# Patient Record
Sex: MALE | Race: BLACK OR AFRICAN AMERICAN | NOT HISPANIC OR LATINO | Employment: UNEMPLOYED | URBAN - METROPOLITAN AREA
[De-identification: names, ages, dates, MRNs, and addresses within clinical notes are randomized per-mention and may not be internally consistent; named-entity substitution may affect disease eponyms.]

---

## 2017-11-28 PROBLEM — I10 HTN (HYPERTENSION): Status: ACTIVE | Noted: 2017-11-28

## 2017-11-28 PROBLEM — R07.89 ATYPICAL CHEST PAIN: Status: ACTIVE | Noted: 2017-11-28

## 2017-11-28 PROBLEM — R07.9 CHEST PAIN WITH MODERATE RISK FOR CARDIAC ETIOLOGY: Status: ACTIVE | Noted: 2017-11-28

## 2017-11-28 PROBLEM — M54.2 NECK PAIN: Status: ACTIVE | Noted: 2017-11-28

## 2017-11-28 PROBLEM — R51.9 HEADACHE: Status: ACTIVE | Noted: 2017-11-28

## 2018-04-30 ENCOUNTER — TELEPHONE (OUTPATIENT)
Dept: NEUROLOGY | Facility: CLINIC | Age: 38
End: 2018-04-30

## 2018-04-30 NOTE — TELEPHONE ENCOUNTER
----- Message from Sara Douglas sent at 4/30/2018  1:16 PM CDT -----  Call [phone] ... Pt states he was released Rony morning from [facility] advised to see Dr Haily Douglas / but doesn't know anything about a referral

## 2018-05-22 ENCOUNTER — TELEPHONE (OUTPATIENT)
Dept: NEUROLOGY | Facility: CLINIC | Age: 38
End: 2018-05-22

## 2018-05-22 NOTE — TELEPHONE ENCOUNTER
----- Message from Ulices Oh sent at 5/21/2018  5:13 PM CDT -----  Contact: Pt  Pt called to schedule appt due to verbal referral from pcp.    Pt is experiencing bad headaches and facial muscle spasms.    Pt can be reached at [phone].    Thank you!

## 2018-05-22 NOTE — TELEPHONE ENCOUNTER
----- Message from Ulices Oh sent at 5/21/2018  5:13 PM CDT -----  Contact: Pt  Pt called to schedule appt due to verbal referral from pcp.    Pt is experiencing bad headaches and facial muscle spasms.    Pt can be reached at [phone].    Thank you!

## 2018-05-22 NOTE — TELEPHONE ENCOUNTER
Called pt in regards to scheduling new patient appointment. Informed pt at this time we have no medicaid spots available. Pt was given neurology on the [facility] number. Pt verbalized understanding.

## 2019-12-07 PROBLEM — E87.6 HYPOKALEMIA: Status: ACTIVE | Noted: 2019-12-07

## 2019-12-07 PROBLEM — G89.29 CHRONIC MIDLINE LOW BACK PAIN WITHOUT SCIATICA: Status: ACTIVE | Noted: 2019-12-07

## 2019-12-07 PROBLEM — G35 MULTIPLE SCLEROSIS: Status: ACTIVE | Noted: 2019-12-07

## 2019-12-07 PROBLEM — E78.5 HYPERLIPIDEMIA: Status: ACTIVE | Noted: 2019-12-07

## 2019-12-07 PROBLEM — G45.9 TIA (TRANSIENT ISCHEMIC ATTACK): Status: ACTIVE | Noted: 2019-12-07

## 2019-12-07 PROBLEM — M54.50 CHRONIC MIDLINE LOW BACK PAIN WITHOUT SCIATICA: Status: ACTIVE | Noted: 2019-12-07

## 2019-12-22 PROBLEM — R29.818 FOCAL NEUROLOGICAL DEFICIT: Status: ACTIVE | Noted: 2019-12-22

## 2020-07-16 PROBLEM — R06.02 SHORTNESS OF BREATH: Status: ACTIVE | Noted: 2020-07-16

## 2020-07-17 DIAGNOSIS — U07.1 COVID-19 VIRUS DETECTED: ICD-10-CM

## 2020-07-18 PROBLEM — J12.82 PNEUMONIA DUE TO COVID-19 VIRUS: Status: ACTIVE | Noted: 2020-07-18

## 2020-07-18 PROBLEM — U07.1 PNEUMONIA DUE TO COVID-19 VIRUS: Status: ACTIVE | Noted: 2020-07-18

## 2020-07-22 PROBLEM — R07.89 ATYPICAL CHEST PAIN: Status: RESOLVED | Noted: 2017-11-28 | Resolved: 2020-07-22

## 2020-07-29 ENCOUNTER — HOSPITAL ENCOUNTER (EMERGENCY)
Facility: HOSPITAL | Age: 40
Discharge: HOME | End: 2020-07-29
Attending: EMERGENCY MEDICINE
Payer: MEDICAID

## 2020-07-29 VITALS
HEART RATE: 74 BPM | WEIGHT: 220 LBS | BODY MASS INDEX: 34.53 KG/M2 | TEMPERATURE: 99 F | OXYGEN SATURATION: 98 % | SYSTOLIC BLOOD PRESSURE: 135 MMHG | RESPIRATION RATE: 18 BRPM | HEIGHT: 67 IN | DIASTOLIC BLOOD PRESSURE: 90 MMHG

## 2020-07-29 DIAGNOSIS — R50.9 COUGH WITH FEVER: ICD-10-CM

## 2020-07-29 DIAGNOSIS — R05.9 COUGH WITH FEVER: ICD-10-CM

## 2020-07-29 DIAGNOSIS — R05.9 COUGH: Primary | ICD-10-CM

## 2020-07-29 DIAGNOSIS — Z71.89 EDUCATED ABOUT COVID-19 VIRUS INFECTION: ICD-10-CM

## 2020-07-29 PROCEDURE — 99283 EMERGENCY DEPT VISIT LOW MDM: CPT | Mod: 25

## 2020-07-29 NOTE — DISCHARGE INSTRUCTIONS
Home quarantineAs discussed  Follow-up with [facility]  or your primary care provider  Return for any concerns

## 2020-07-29 NOTE — ED PROVIDER NOTES
Encounter Date: 7/29/2020       History     Chief Complaint   Patient presents with    Cough     Pt states tested positive for covid on 7/16/20. Pt c/o feeling like he has a lot of phelgm that he is not able to cough up.     40-year-old well-appearing male presents to the emergency department he tested positive for the COVID virus on July 16 he was admitted to the hospital on July 18th he was discharged that he went back to the emergency department on July 20th secondary to being anxious related to his recent COVID diagnosis this patient is been on multiple regimens of antibiotics and even has taken a round of from does severe.  The patient is supposed to be on home quarantine and have pulse ox monitoring however the patient decided he was going to go somewhere today while he was driving he still it states that he felt as if he had some phlegm in his throat and coughed so he came to this emergency department he appears well he has no obvious respiratory distress he is afebrile he is oxygenating well on room air.        Review of patient's allergies indicates:  No Known Allergies  Past Medical History:   Diagnosis Date    Atypical chest pain 11/28/2017    Cervical stenosis of spine     Herniated disc, cervical     Hypertension     Lumbar back pain 08/2016    Multiple sclerosis 2018    Neuropathy     Testicular torsion      Past Surgical History:   Procedure Laterality Date    TESTICLE SURGERY       No family history on file.  Social History     Tobacco Use    Smoking status: Never Smoker    Smokeless tobacco: Never Used   Substance Use Topics    Alcohol use: No    Drug use: No     Review of Systems   Constitutional: Negative for chills and fever.   HENT: Negative.    Respiratory: Positive for cough. Negative for shortness of breath.    Cardiovascular: Negative.    Gastrointestinal: Negative.    Musculoskeletal: Negative.    Skin: Negative.    Hematological: Negative.    Psychiatric/Behavioral: Negative.         Physical Exam     Initial Vitals [07/29/20 1426]   BP Pulse Resp Temp SpO2   121/89 67 19 98.7 °F (37.1 °C) 97 %      MAP       --         Physical Exam    Nursing note and vitals reviewed.  Constitutional: He appears well-developed and well-nourished.   HENT:   Head: Normocephalic.   Eyes: Pupils are equal, round, and reactive to light.   Cardiovascular: Normal rate, regular rhythm, normal heart sounds and intact distal pulses.   Pulmonary/Chest: Breath sounds normal. No respiratory distress.   Abdominal: Soft.   Neurological: He is alert and oriented to person, place, and time. He has normal strength.   Skin: Skin is warm.         ED Course   Procedures  Labs Reviewed - No data to display       Imaging Results          X-Ray Chest PA And Lateral (Final result)  Result time 07/29/20 15:43:18    Final result by Ramesh Wang MD (07/29/20 15:43:18)                 Narrative:    Reason: Cough and fever.    FINDINGS:    PA and lateral chest with comparison chest x-ray July 18, 2020 show  normal cardiomediastinal silhouette.  Bibasilar linear opacities noted. No confluent airspace opacification.  Pulmonary vasculature is normal. No acute osseous abnormality.    IMPRESSION:    Bibasilar linear opacities felt to reflect subsegmental atelectasis.    Electronically Signed by Ramesh Wang on 7/29/2020 3:46 PM                               Medical Decision Making:   Initial Assessment:   Cough  ED Management:  Patient presents to the emergency department with complaint of cough he states he felt a tickle in his throat while driving his vehicle patient at tears very anxious as he was diagnosed previously on July 16 with COVID 19 virus he has been on several rounds of antibiotics, and remdesivir.  The patient also was seen at [facility] for the same complaint on July 20th and discharged home he is supposed to be on home quarantine which was discussed with him.. I did review last three previous xrays which were  personally evaluated by Dr Del Cid reports no new changes no infiltrates patient can be dc'd .                                 Clinical Impression:       ICD-10-CM ICD-9-CM   1. Cough  R05 786.2   2. Cough with fever  R05 786.2    R50.9 780.60   3. Educated About Covid-19 Virus Infection  Z71.89                                 Taylor Verduzco, [facility]  07/29/20 [id number]

## 2021-05-10 ENCOUNTER — PATIENT MESSAGE (OUTPATIENT)
Dept: RESEARCH | Facility: HOSPITAL | Age: 41
End: 2021-05-10

## 2021-12-21 ENCOUNTER — PATIENT MESSAGE (OUTPATIENT)
Dept: NEUROLOGY | Facility: CLINIC | Age: 41
End: 2021-12-21
Payer: MEDICAID

## 2022-02-28 ENCOUNTER — PATIENT MESSAGE (OUTPATIENT)
Dept: PSYCHIATRY | Facility: CLINIC | Age: 42
End: 2022-02-28
Payer: MEDICAID

## 2022-06-24 ENCOUNTER — PATIENT MESSAGE (OUTPATIENT)
Dept: PSYCHIATRY | Facility: CLINIC | Age: 42
End: 2022-06-24
Payer: MEDICAID

## 2022-12-01 ENCOUNTER — PATIENT MESSAGE (OUTPATIENT)
Dept: PSYCHIATRY | Facility: CLINIC | Age: 42
End: 2022-12-01
Payer: MEDICAID

## 2023-02-20 ENCOUNTER — PATIENT MESSAGE (OUTPATIENT)
Dept: PSYCHIATRY | Facility: CLINIC | Age: 43
End: 2023-02-20
Payer: MEDICAID

## 2023-07-21 ENCOUNTER — PATIENT MESSAGE (OUTPATIENT)
Dept: PSYCHIATRY | Facility: CLINIC | Age: 43
End: 2023-07-21
Payer: MEDICAID

## 2024-01-22 ENCOUNTER — PATIENT MESSAGE (OUTPATIENT)
Dept: PSYCHIATRY | Facility: CLINIC | Age: 44
End: 2024-01-22

## 2024-03-26 ENCOUNTER — PATIENT MESSAGE (OUTPATIENT)
Dept: PSYCHIATRY | Facility: CLINIC | Age: 44
End: 2024-03-26

## 2024-05-02 ENCOUNTER — PATIENT MESSAGE (OUTPATIENT)
Dept: PSYCHIATRY | Facility: CLINIC | Age: 44
End: 2024-05-02

## 2024-05-20 ENCOUNTER — PATIENT MESSAGE (OUTPATIENT)
Dept: PSYCHIATRY | Facility: CLINIC | Age: 44
End: 2024-05-20

## 2024-07-25 ENCOUNTER — PATIENT MESSAGE (OUTPATIENT)
Dept: PSYCHIATRY | Facility: CLINIC | Age: 44
End: 2024-07-25

## 2024-08-05 ENCOUNTER — PATIENT MESSAGE (OUTPATIENT)
Dept: PSYCHIATRY | Facility: CLINIC | Age: 44
End: 2024-08-05
Payer: MEDICAID

## 2024-08-12 PROBLEM — R73.03 PREDIABETES: Status: ACTIVE | Noted: 2024-08-12

## 2024-08-12 PROBLEM — E04.2 MULTIPLE THYROID NODULES: Status: ACTIVE | Noted: 2024-08-12

## 2024-08-12 PROBLEM — Z00.00 WELLNESS EXAMINATION: Status: ACTIVE | Noted: 2024-08-12

## 2024-09-03 PROBLEM — G89.29 CHRONIC PAIN OF RIGHT WRIST: Status: ACTIVE | Noted: 2024-09-03

## 2024-09-03 PROBLEM — M25.531 CHRONIC PAIN OF RIGHT WRIST: Status: ACTIVE | Noted: 2024-09-03

## 2024-09-04 PROBLEM — R79.89 ABNORMAL LFTS: Status: ACTIVE | Noted: 2024-09-04

## 2024-09-25 PROBLEM — G89.29 CHRONIC NECK PAIN: Status: ACTIVE | Noted: 2017-11-28

## 2024-10-09 PROBLEM — M25.60 RANGE OF MOTION DEFICIT: Status: ACTIVE | Noted: 2024-10-09

## 2024-12-11 PROBLEM — M25.531 CHRONIC PAIN OF RIGHT WRIST: Status: RESOLVED | Noted: 2024-09-03 | Resolved: 2024-12-11

## 2024-12-11 PROBLEM — G89.29 CHRONIC PAIN OF RIGHT WRIST: Status: RESOLVED | Noted: 2024-09-03 | Resolved: 2024-12-11

## 2024-12-11 PROBLEM — M25.60 RANGE OF MOTION DEFICIT: Status: RESOLVED | Noted: 2024-10-09 | Resolved: 2024-12-11

## 2024-12-16 ENCOUNTER — PATIENT MESSAGE (OUTPATIENT)
Dept: PSYCHIATRY | Facility: CLINIC | Age: 44
End: 2024-12-16
Payer: MEDICAID

## 2025-08-22 ENCOUNTER — TELEPHONE (OUTPATIENT)
Dept: PAIN MEDICINE | Facility: CLINIC | Age: 45
End: 2025-08-22
Payer: MEDICAID